# Patient Record
Sex: FEMALE | Race: WHITE | Employment: UNEMPLOYED | URBAN - METROPOLITAN AREA
[De-identification: names, ages, dates, MRNs, and addresses within clinical notes are randomized per-mention and may not be internally consistent; named-entity substitution may affect disease eponyms.]

---

## 2017-08-30 ENCOUNTER — HOSPITAL ENCOUNTER (OUTPATIENT)
Age: 32
Discharge: HOME | End: 2017-08-30
Attending: FAMILY MEDICINE
Payer: MEDICAID

## 2017-08-30 VITALS
HEIGHT: 61 IN | DIASTOLIC BLOOD PRESSURE: 70 MMHG | TEMPERATURE: 99 F | WEIGHT: 215 LBS | HEART RATE: 102 BPM | SYSTOLIC BLOOD PRESSURE: 121 MMHG | BODY MASS INDEX: 40.59 KG/M2 | OXYGEN SATURATION: 98 % | RESPIRATION RATE: 20 BRPM

## 2017-08-30 DIAGNOSIS — J11.1 FLU SYNDROME: ICD-10-CM

## 2017-08-30 DIAGNOSIS — J06.9 UPPER RESPIRATORY TRACT INFECTION, UNSPECIFIED TYPE: Primary | ICD-10-CM

## 2017-08-30 DIAGNOSIS — J02.9 ACUTE PHARYNGITIS, UNSPECIFIED ETIOLOGY: ICD-10-CM

## 2017-08-30 LAB — POCT RAPID STREP: NEGATIVE

## 2017-08-30 PROCEDURE — 87430 STREP A AG IA: CPT | Performed by: FAMILY MEDICINE

## 2017-08-30 PROCEDURE — 87081 CULTURE SCREEN ONLY: CPT | Performed by: FAMILY MEDICINE

## 2017-08-30 PROCEDURE — 99204 OFFICE O/P NEW MOD 45 MIN: CPT

## 2017-08-30 PROCEDURE — 99203 OFFICE O/P NEW LOW 30 MIN: CPT

## 2017-08-30 RX ORDER — PRENATAL VIT/IRON FUM/FOLIC AC 27MG-0.8MG
1 TABLET ORAL DAILY
COMMUNITY
End: 2020-03-10

## 2017-08-30 NOTE — ED PROVIDER NOTES
Patient presents with:  Fever  Cough  Body ache and/or chills    HPI:     Coreen Aaron is a 28year old female who presents with for chief complaint of nasal congestion, coryza, clear rhinorrhea, sore throat, headache, body aches, muscle aches, fatigue, ma tenderness. No nasal flaring  Throat: abnormal findings: mild oropharyngeal erythema  Neck: no adenopathy  Lungs: clear to auscultation bilaterally. No wheezing, rhonchi or crackles No chest wall retractions. No respiratory distress. No tachypnea noted. Jona Tejada

## 2018-02-27 ENCOUNTER — HOSPITAL ENCOUNTER (OUTPATIENT)
Age: 33
Discharge: HOME | End: 2018-02-27
Attending: FAMILY MEDICINE
Payer: MEDICAID

## 2018-02-27 VITALS
BODY MASS INDEX: 37.76 KG/M2 | DIASTOLIC BLOOD PRESSURE: 76 MMHG | TEMPERATURE: 98 F | RESPIRATION RATE: 16 BRPM | SYSTOLIC BLOOD PRESSURE: 118 MMHG | HEIGHT: 61 IN | WEIGHT: 200 LBS | OXYGEN SATURATION: 96 % | HEART RATE: 86 BPM

## 2018-02-27 DIAGNOSIS — J02.0 STREPTOCOCCAL SORE THROAT: ICD-10-CM

## 2018-02-27 DIAGNOSIS — J03.90 ACUTE TONSILLITIS, UNSPECIFIED ETIOLOGY: Primary | ICD-10-CM

## 2018-02-27 LAB — POCT RAPID STREP: POSITIVE

## 2018-02-27 PROCEDURE — 87430 STREP A AG IA: CPT | Performed by: FAMILY MEDICINE

## 2018-02-27 PROCEDURE — 99213 OFFICE O/P EST LOW 20 MIN: CPT

## 2018-02-27 PROCEDURE — 99214 OFFICE O/P EST MOD 30 MIN: CPT

## 2018-02-27 RX ORDER — AMOXICILLIN 875 MG/1
875 TABLET, COATED ORAL 2 TIMES DAILY
Qty: 20 TABLET | Refills: 0 | Status: SHIPPED | OUTPATIENT
Start: 2018-02-27 | End: 2018-03-09

## 2018-02-27 NOTE — ED PROVIDER NOTES
Patient Seen in: [address] [facility]    History   Patient presents with:  Cough/URI  Sore Throat    Stated Complaint: cough/sinus congestion    HPI    26-year-old female presented chief complaint of sore throat, swollen glands and fevers nasa bilaterally  Heart S1-S2 heard no murmurs no gallops  Skin shows no rash        ED Course     Labs Reviewed   POCT RAPID STREP - Abnormal; Notable for the following:        Result Value    POCT Rapid Strep Positive (*)     All other components within bettina

## 2019-11-12 ENCOUNTER — HOSPITAL ENCOUNTER (OUTPATIENT)
Age: 34
Discharge: HOME | End: 2019-11-12
Attending: FAMILY MEDICINE
Payer: MEDICAID

## 2019-11-12 VITALS
TEMPERATURE: 99 F | BODY MASS INDEX: 40.59 KG/M2 | OXYGEN SATURATION: 97 % | SYSTOLIC BLOOD PRESSURE: 136 MMHG | RESPIRATION RATE: 16 BRPM | HEIGHT: 61 IN | HEART RATE: 98 BPM | DIASTOLIC BLOOD PRESSURE: 95 MMHG | WEIGHT: 215 LBS

## 2019-11-12 DIAGNOSIS — H10.9 CONJUNCTIVITIS OF RIGHT EYE, UNSPECIFIED CONJUNCTIVITIS TYPE: ICD-10-CM

## 2019-11-12 DIAGNOSIS — B34.9 VIRAL SYNDROME: Primary | ICD-10-CM

## 2019-11-12 DIAGNOSIS — J02.9 PHARYNGITIS, UNSPECIFIED ETIOLOGY: ICD-10-CM

## 2019-11-12 PROCEDURE — 87430 STREP A AG IA: CPT | Performed by: FAMILY MEDICINE

## 2019-11-12 PROCEDURE — 87081 CULTURE SCREEN ONLY: CPT | Performed by: FAMILY MEDICINE

## 2019-11-12 PROCEDURE — 99214 OFFICE O/P EST MOD 30 MIN: CPT

## 2019-11-12 RX ORDER — TOBRAMYCIN 3 MG/ML
2 SOLUTION/ DROPS OPHTHALMIC EVERY 8 HOURS
Qty: 10 ML | Refills: 0 | Status: SHIPPED | OUTPATIENT
Start: 2019-11-12 | End: 2019-11-17

## 2019-11-13 NOTE — ED INITIAL ASSESSMENT (HPI)
Sore throat started over the weekend. Felt like she had a fever. Right eye redness, drainage and itching started yesterday. Also having right ear pain.

## 2019-11-13 NOTE — ED PROVIDER NOTES
Patient Seen in: [address] [facility]      History   Patient presents with:  Conjunctivitis  Sore Throat    Stated Complaint: eye redness, sore throat    HPI  72-year-old female coming in with right eye redness and drainage that started yester occlusion, no obstruction, bilateral tympanic membranes-middle ear effusion noted, no signs of otitis media, nares normal  NECK: FROM, supple anterior cervical lymphadenopathy noted bilaterally  LUNGS: CTAB, no RRW  CV: RRR  ABD: not distended  NEURO: Aler

## 2020-03-10 ENCOUNTER — HOSPITAL ENCOUNTER (OUTPATIENT)
Age: 35
Discharge: HOME | End: 2020-03-10
Attending: FAMILY MEDICINE
Payer: MEDICAID

## 2020-03-10 VITALS
RESPIRATION RATE: 16 BRPM | HEART RATE: 92 BPM | OXYGEN SATURATION: 98 % | SYSTOLIC BLOOD PRESSURE: 113 MMHG | TEMPERATURE: 99 F | DIASTOLIC BLOOD PRESSURE: 59 MMHG

## 2020-03-10 DIAGNOSIS — R09.82 POST-NASAL DRIP: ICD-10-CM

## 2020-03-10 DIAGNOSIS — J01.00 ACUTE MAXILLARY SINUSITIS, RECURRENCE NOT SPECIFIED: Primary | ICD-10-CM

## 2020-03-10 PROCEDURE — 99212 OFFICE O/P EST SF 10 MIN: CPT

## 2020-03-10 RX ORDER — IBUPROFEN 600 MG/1
600 TABLET ORAL ONCE
Status: COMPLETED | OUTPATIENT
Start: 2020-03-10 | End: 2020-03-10

## 2020-03-10 NOTE — ED PROVIDER NOTES
Patient Seen in: [address] [facility]      History   Patient presents with:  Cough/URI  Sore Throat  Ear Problem Pain    Stated Complaint: Cough, Ear Pain    HPI  This is a 70-year-old female coming in with 1 to 2 days of cough, with some left to display  Orders Placed This Encounter      ibuprofen (MOTRIN) tab 600 mg    Patient verbalized understanding and agreed with the plan.          MDM       · OTC Flonase NS 2 puffs each nostril once daily to help with sinus inflammation and reduce post addy

## 2021-08-24 PROBLEM — Z98.84 S/P LAPAROSCOPIC SLEEVE GASTRECTOMY: Status: ACTIVE | Noted: 2021-08-24

## 2021-08-28 PROBLEM — K31.89 GASTRIC STENOSIS: Status: ACTIVE | Noted: 2021-08-28

## 2021-08-28 PROBLEM — R11.0 NAUSEA: Status: ACTIVE | Noted: 2021-08-28

## 2021-08-28 PROBLEM — K21.9 GASTROESOPHAGEAL REFLUX DISEASE, UNSPECIFIED WHETHER ESOPHAGITIS PRESENT: Status: ACTIVE | Noted: 2021-08-28

## 2021-08-28 PROBLEM — K44.9 HIATAL HERNIA: Status: ACTIVE | Noted: 2021-08-28

## 2021-09-03 ENCOUNTER — HOSPITAL ENCOUNTER (EMERGENCY)
Facility: HOSPITAL | Age: 36
Discharge: HOME | End: 2021-09-03
Attending: EMERGENCY MEDICINE
Payer: MEDICAID

## 2021-09-03 VITALS
HEART RATE: 69 BPM | SYSTOLIC BLOOD PRESSURE: 104 MMHG | OXYGEN SATURATION: 100 % | DIASTOLIC BLOOD PRESSURE: 72 MMHG | WEIGHT: 160 LBS | RESPIRATION RATE: 16 BRPM | HEIGHT: 61 IN | BODY MASS INDEX: 30.21 KG/M2 | TEMPERATURE: 98 F

## 2021-09-03 DIAGNOSIS — E87.6 HYPOKALEMIA: ICD-10-CM

## 2021-09-03 DIAGNOSIS — R11.2 NAUSEA AND VOMITING, INTRACTABILITY OF VOMITING NOT SPECIFIED, UNSPECIFIED VOMITING TYPE: Primary | ICD-10-CM

## 2021-09-03 DIAGNOSIS — R31.9 URINARY TRACT INFECTION WITH HEMATURIA, SITE UNSPECIFIED: ICD-10-CM

## 2021-09-03 DIAGNOSIS — N39.0 URINARY TRACT INFECTION WITH HEMATURIA, SITE UNSPECIFIED: ICD-10-CM

## 2021-09-03 LAB
ALBUMIN SERPL-MCNC: 3.5 G/DL (ref 3.4–5)
ALBUMIN/GLOB SERPL: 0.7 {RATIO} (ref 1–2)
ALP LIVER SERPL-CCNC: 83 U/L
ALT SERPL-CCNC: 73 U/L
ANION GAP SERPL CALC-SCNC: 13 MMOL/L (ref 0–18)
AST SERPL-CCNC: 41 U/L (ref 15–37)
B-HCG UR QL: NEGATIVE
BASOPHILS # BLD AUTO: 0.05 X10(3) UL (ref 0–0.2)
BASOPHILS NFR BLD AUTO: 0.8 %
BILIRUB SERPL-MCNC: 1.6 MG/DL (ref 0.1–2)
BILIRUB UR QL CFM: POSITIVE
BUN BLD-MCNC: 5 MG/DL (ref 7–18)
CALCIUM BLD-MCNC: 9 MG/DL (ref 8.5–10.1)
CHLORIDE SERPL-SCNC: 104 MMOL/L (ref 98–112)
CO2 SERPL-SCNC: 21 MMOL/L (ref 21–32)
COLOR UR AUTO: YELLOW
CREAT BLD-MCNC: 0.54 MG/DL
EOSINOPHIL # BLD AUTO: 0.06 X10(3) UL (ref 0–0.7)
EOSINOPHIL NFR BLD AUTO: 1 %
ERYTHROCYTE [DISTWIDTH] IN BLOOD BY AUTOMATED COUNT: 14.6 %
GLOBULIN PLAS-MCNC: 4.8 G/DL (ref 2.8–4.4)
GLUCOSE BLD-MCNC: 77 MG/DL (ref 70–99)
GLUCOSE UR STRIP.AUTO-MCNC: NEGATIVE MG/DL
HCT VFR BLD AUTO: 39.8 %
HGB BLD-MCNC: 14.2 G/DL
HYALINE CASTS #/AREA URNS AUTO: PRESENT /LPF
IMM GRANULOCYTES # BLD AUTO: 0.02 X10(3) UL (ref 0–1)
IMM GRANULOCYTES NFR BLD: 0.3 %
KETONES UR STRIP.AUTO-MCNC: 80 MG/DL
LEUKOCYTE ESTERASE UR QL STRIP.AUTO: NEGATIVE
LYMPHOCYTES # BLD AUTO: 1.41 X10(3) UL (ref 1–4)
LYMPHOCYTES NFR BLD AUTO: 22.8 %
M PROTEIN MFR SERPL ELPH: 8.3 G/DL (ref 6.4–8.2)
MCH RBC QN AUTO: 31.6 PG (ref 26–34)
MCHC RBC AUTO-ENTMCNC: 35.7 G/DL (ref 31–37)
MCV RBC AUTO: 88.4 FL
MONOCYTES # BLD AUTO: 0.53 X10(3) UL (ref 0.1–1)
MONOCYTES NFR BLD AUTO: 8.6 %
NEUTROPHILS # BLD AUTO: 4.11 X10 (3) UL (ref 1.5–7.7)
NEUTROPHILS # BLD AUTO: 4.11 X10(3) UL (ref 1.5–7.7)
NEUTROPHILS NFR BLD AUTO: 66.5 %
NITRITE UR QL STRIP.AUTO: NEGATIVE
OSMOLALITY SERPL CALC.SUM OF ELEC: 282 MOSM/KG (ref 275–295)
PH UR STRIP.AUTO: 6 [PH] (ref 5–8)
PLATELET # BLD AUTO: 305 10(3)UL (ref 150–450)
POTASSIUM SERPL-SCNC: 2.8 MMOL/L (ref 3.5–5.1)
PROT UR STRIP.AUTO-MCNC: 100 MG/DL
RBC # BLD AUTO: 4.5 X10(6)UL
RBC UR QL AUTO: NEGATIVE
SODIUM SERPL-SCNC: 138 MMOL/L (ref 136–145)
SP GR UR STRIP.AUTO: 1.02 (ref 1–1.03)
UROBILINOGEN UR STRIP.AUTO-MCNC: 4 MG/DL
WBC # BLD AUTO: 6.2 X10(3) UL (ref 4–11)

## 2021-09-03 PROCEDURE — 87086 URINE CULTURE/COLONY COUNT: CPT | Performed by: EMERGENCY MEDICINE

## 2021-09-03 PROCEDURE — 96365 THER/PROPH/DIAG IV INF INIT: CPT

## 2021-09-03 PROCEDURE — 85025 COMPLETE CBC W/AUTO DIFF WBC: CPT | Performed by: EMERGENCY MEDICINE

## 2021-09-03 PROCEDURE — 99284 EMERGENCY DEPT VISIT MOD MDM: CPT

## 2021-09-03 PROCEDURE — 96375 TX/PRO/DX INJ NEW DRUG ADDON: CPT

## 2021-09-03 PROCEDURE — 99285 EMERGENCY DEPT VISIT HI MDM: CPT

## 2021-09-03 PROCEDURE — 80053 COMPREHEN METABOLIC PANEL: CPT | Performed by: EMERGENCY MEDICINE

## 2021-09-03 PROCEDURE — 85025 COMPLETE CBC W/AUTO DIFF WBC: CPT

## 2021-09-03 PROCEDURE — 96367 TX/PROPH/DG ADDL SEQ IV INF: CPT

## 2021-09-03 PROCEDURE — 96366 THER/PROPH/DIAG IV INF ADDON: CPT

## 2021-09-03 PROCEDURE — 81001 URINALYSIS AUTO W/SCOPE: CPT | Performed by: EMERGENCY MEDICINE

## 2021-09-03 PROCEDURE — 80053 COMPREHEN METABOLIC PANEL: CPT

## 2021-09-03 PROCEDURE — 81025 URINE PREGNANCY TEST: CPT

## 2021-09-03 PROCEDURE — 96361 HYDRATE IV INFUSION ADD-ON: CPT

## 2021-09-03 RX ORDER — METOCLOPRAMIDE HYDROCHLORIDE 5 MG/ML
10 INJECTION INTRAMUSCULAR; INTRAVENOUS ONCE
Status: COMPLETED | OUTPATIENT
Start: 2021-09-03 | End: 2021-09-03

## 2021-09-03 RX ORDER — CEPHALEXIN 250 MG/5ML
500 POWDER, FOR SUSPENSION ORAL 4 TIMES DAILY
Qty: 400 ML | Refills: 0 | Status: SHIPPED | OUTPATIENT
Start: 2021-09-03 | End: 2021-09-08

## 2021-09-03 RX ORDER — POTASSIUM CHLORIDE 1.5 G/1.77G
20 POWDER, FOR SOLUTION ORAL 2 TIMES DAILY
Status: DISCONTINUED | OUTPATIENT
Start: 2021-09-03 | End: 2021-09-03

## 2021-09-03 RX ORDER — POTASSIUM CHLORIDE 1.5 G/1.77G
20 POWDER, FOR SOLUTION ORAL 2 TIMES DAILY
Qty: 10 PACKET | Refills: 0 | Status: SHIPPED | OUTPATIENT
Start: 2021-09-03 | End: 2021-09-08

## 2021-09-03 RX ORDER — METOCLOPRAMIDE 10 MG/1
10 TABLET ORAL 3 TIMES DAILY PRN
Qty: 20 TABLET | Refills: 0 | Status: SHIPPED | OUTPATIENT
Start: 2021-09-03 | End: 2021-10-03

## 2021-09-03 NOTE — ED PROVIDER NOTES
Patient Seen in: [facility] Emergency Department      History   Patient presents with:  Nausea/Vomiting/Diarrhea    Stated Complaint: n/v, dehydration x few weeks.      HPI/Subjective:   HPI    29-year-old female presents to the ER with 3 weeks of vo apparent distress alert and oriented ×3  HEENT: Pupils are equal reactive to light. Extra ocular motions are intact. No scleral icterus or conjunctival pallor. Head is atraumatic normocephalic. Oral mucosa moist.  Tongue is midline.     Lungs: Clear to CBC W/ DIFFERENTIAL[[id number]]                              Final result                 Please view results for these tests on the individual orders.    RAINBOW DRAW LAVENDER   RAINBOW DRAW LIGHT GREEN   RAINBOW DRAW GOLD   URINE Janene Delgado medications    potassium chloride 20 MEQ Oral Powd Pack  Take 20 mEq by mouth 2 (two) times daily for 5 days. Qty: 10 packet Refills: 0    cephALEXin 250 MG/5ML Oral Recon Susp  Take 10 mL (500 mg total) by mouth 4 (four) times daily for 10 days.   Qty: 40

## 2021-09-04 NOTE — ED PROVIDER NOTES
Patient states that she is feeling better. She was able to tolerate fluids. She states that when she was taking the potassium liquid she could only do small amounts as it did make her upset this is not unusual with potassium.   She is advised to take it a

## 2021-09-08 ENCOUNTER — HOSPITAL ENCOUNTER (INPATIENT)
Facility: HOSPITAL | Age: 36
LOS: 4 days | Discharge: ANOTHER HOSPITAL | DRG: 395 | End: 2021-09-13
Attending: EMERGENCY MEDICINE | Admitting: HOSPITALIST
Payer: MEDICAID

## 2021-09-08 DIAGNOSIS — R11.10 INTRACTABLE VOMITING, PRESENCE OF NAUSEA NOT SPECIFIED, UNSPECIFIED VOMITING TYPE: Primary | ICD-10-CM

## 2021-09-08 DIAGNOSIS — E87.6 HYPOKALEMIA: ICD-10-CM

## 2021-09-08 LAB
ALBUMIN SERPL-MCNC: 3.4 G/DL (ref 3.4–5)
ALBUMIN/GLOB SERPL: 0.7 {RATIO} (ref 1–2)
ALP LIVER SERPL-CCNC: 86 U/L
ALT SERPL-CCNC: 100 U/L
ANION GAP SERPL CALC-SCNC: 16 MMOL/L (ref 0–18)
AST SERPL-CCNC: 54 U/L (ref 15–37)
BASOPHILS # BLD AUTO: 0.06 X10(3) UL (ref 0–0.2)
BASOPHILS NFR BLD AUTO: 0.9 %
BILIRUB SERPL-MCNC: 1.2 MG/DL (ref 0.1–2)
BUN BLD-MCNC: 6 MG/DL (ref 7–18)
CALCIUM BLD-MCNC: 9.3 MG/DL (ref 8.5–10.1)
CHLORIDE SERPL-SCNC: 100 MMOL/L (ref 98–112)
CO2 SERPL-SCNC: 22 MMOL/L (ref 21–32)
CREAT BLD-MCNC: 0.47 MG/DL
EOSINOPHIL # BLD AUTO: 0.02 X10(3) UL (ref 0–0.7)
EOSINOPHIL NFR BLD AUTO: 0.3 %
ERYTHROCYTE [DISTWIDTH] IN BLOOD BY AUTOMATED COUNT: 14.1 %
GLOBULIN PLAS-MCNC: 4.9 G/DL (ref 2.8–4.4)
GLUCOSE BLD-MCNC: 84 MG/DL (ref 70–99)
HCT VFR BLD AUTO: 39.1 %
HGB BLD-MCNC: 14.1 G/DL
IMM GRANULOCYTES # BLD AUTO: 0.04 X10(3) UL (ref 0–1)
IMM GRANULOCYTES NFR BLD: 0.6 %
LYMPHOCYTES # BLD AUTO: 1.54 X10(3) UL (ref 1–4)
LYMPHOCYTES NFR BLD AUTO: 23.3 %
M PROTEIN MFR SERPL ELPH: 8.3 G/DL (ref 6.4–8.2)
MCH RBC QN AUTO: 31.8 PG (ref 26–34)
MCHC RBC AUTO-ENTMCNC: 36.1 G/DL (ref 31–37)
MCV RBC AUTO: 88.1 FL
MONOCYTES # BLD AUTO: 0.71 X10(3) UL (ref 0.1–1)
MONOCYTES NFR BLD AUTO: 10.7 %
NEUTROPHILS # BLD AUTO: 4.24 X10 (3) UL (ref 1.5–7.7)
NEUTROPHILS # BLD AUTO: 4.24 X10(3) UL (ref 1.5–7.7)
NEUTROPHILS NFR BLD AUTO: 64.2 %
OSMOLALITY SERPL CALC.SUM OF ELEC: 283 MOSM/KG (ref 275–295)
PLATELET # BLD AUTO: 338 10(3)UL (ref 150–450)
POTASSIUM SERPL-SCNC: 2.5 MMOL/L (ref 3.5–5.1)
RBC # BLD AUTO: 4.44 X10(6)UL
SARS-COV-2 RNA RESP QL NAA+PROBE: NOT DETECTED
SODIUM SERPL-SCNC: 138 MMOL/L (ref 136–145)
WBC # BLD AUTO: 6.6 X10(3) UL (ref 4–11)

## 2021-09-08 PROCEDURE — 99285 EMERGENCY DEPT VISIT HI MDM: CPT | Performed by: EMERGENCY MEDICINE

## 2021-09-08 PROCEDURE — 96366 THER/PROPH/DIAG IV INF ADDON: CPT | Performed by: EMERGENCY MEDICINE

## 2021-09-08 PROCEDURE — 85025 COMPLETE CBC W/AUTO DIFF WBC: CPT | Performed by: EMERGENCY MEDICINE

## 2021-09-08 PROCEDURE — 80053 COMPREHEN METABOLIC PANEL: CPT | Performed by: EMERGENCY MEDICINE

## 2021-09-08 PROCEDURE — 96361 HYDRATE IV INFUSION ADD-ON: CPT | Performed by: EMERGENCY MEDICINE

## 2021-09-08 PROCEDURE — 96365 THER/PROPH/DIAG IV INF INIT: CPT | Performed by: EMERGENCY MEDICINE

## 2021-09-08 RX ORDER — ONDANSETRON 2 MG/ML
4 INJECTION INTRAMUSCULAR; INTRAVENOUS EVERY 6 HOURS PRN
Status: DISCONTINUED | OUTPATIENT
Start: 2021-09-08 | End: 2021-09-13

## 2021-09-08 RX ORDER — CEPHALEXIN 250 MG/5ML
500 POWDER, FOR SUSPENSION ORAL 4 TIMES DAILY
COMMUNITY
Start: 2021-09-04 | End: 2021-09-13

## 2021-09-08 RX ORDER — SODIUM CHLORIDE 9 MG/ML
INJECTION, SOLUTION INTRAVENOUS CONTINUOUS
Status: DISCONTINUED | OUTPATIENT
Start: 2021-09-09 | End: 2021-09-13

## 2021-09-08 RX ORDER — ONDANSETRON 2 MG/ML
4 INJECTION INTRAMUSCULAR; INTRAVENOUS EVERY 4 HOURS PRN
Status: ACTIVE | OUTPATIENT
Start: 2021-09-08 | End: 2021-09-09

## 2021-09-08 RX ORDER — SODIUM CHLORIDE 9 MG/ML
INJECTION, SOLUTION INTRAVENOUS CONTINUOUS
Status: ACTIVE | OUTPATIENT
Start: 2021-09-09 | End: 2021-09-09

## 2021-09-08 RX ORDER — MORPHINE SULFATE 2 MG/ML
2 INJECTION, SOLUTION INTRAMUSCULAR; INTRAVENOUS EVERY 2 HOUR PRN
Status: DISCONTINUED | OUTPATIENT
Start: 2021-09-08 | End: 2021-09-13

## 2021-09-08 RX ORDER — MORPHINE SULFATE 2 MG/ML
1 INJECTION, SOLUTION INTRAMUSCULAR; INTRAVENOUS EVERY 2 HOUR PRN
Status: DISCONTINUED | OUTPATIENT
Start: 2021-09-08 | End: 2021-09-13

## 2021-09-08 RX ORDER — POTASSIUM CHLORIDE 1.5 G/1.77G
20 POWDER, FOR SOLUTION ORAL 2 TIMES DAILY
COMMUNITY
Start: 2021-09-04 | End: 2021-09-13

## 2021-09-08 RX ORDER — PROCHLORPERAZINE EDISYLATE 5 MG/ML
5 INJECTION INTRAMUSCULAR; INTRAVENOUS EVERY 8 HOURS PRN
Status: DISCONTINUED | OUTPATIENT
Start: 2021-09-08 | End: 2021-09-13

## 2021-09-08 NOTE — ED INITIAL ASSESSMENT (HPI)
Pt presents to ED with complaint of nausea and vomiting for 3-4 weeks.  Pt reports worse today and feeling weak and dizzy,

## 2021-09-09 LAB
ANION GAP SERPL CALC-SCNC: 12 MMOL/L (ref 0–18)
BUN BLD-MCNC: 7 MG/DL (ref 7–18)
CALCIUM BLD-MCNC: 8.4 MG/DL (ref 8.5–10.1)
CHLORIDE SERPL-SCNC: 105 MMOL/L (ref 98–112)
CO2 SERPL-SCNC: 23 MMOL/L (ref 21–32)
CREAT BLD-MCNC: 0.34 MG/DL
GLUCOSE BLD-MCNC: 77 MG/DL (ref 70–99)
MAGNESIUM SERPL-MCNC: 1.9 MG/DL (ref 1.6–2.6)
OSMOLALITY SERPL CALC.SUM OF ELEC: 287 MOSM/KG (ref 275–295)
POTASSIUM SERPL-SCNC: 2.2 MMOL/L (ref 3.5–5.1)
POTASSIUM SERPL-SCNC: 2.8 MMOL/L (ref 3.5–5.1)
SODIUM SERPL-SCNC: 140 MMOL/L (ref 136–145)

## 2021-09-09 PROCEDURE — 80048 BASIC METABOLIC PNL TOTAL CA: CPT | Performed by: INTERNAL MEDICINE

## 2021-09-09 PROCEDURE — 84132 ASSAY OF SERUM POTASSIUM: CPT | Performed by: INTERNAL MEDICINE

## 2021-09-09 PROCEDURE — 83735 ASSAY OF MAGNESIUM: CPT | Performed by: INTERNAL MEDICINE

## 2021-09-09 PROCEDURE — C9113 INJ PANTOPRAZOLE SODIUM, VIA: HCPCS | Performed by: INTERNAL MEDICINE

## 2021-09-09 RX ORDER — POTASSIUM CHLORIDE 14.9 MG/ML
20 INJECTION INTRAVENOUS ONCE
Status: COMPLETED | OUTPATIENT
Start: 2021-09-10 | End: 2021-09-10

## 2021-09-09 RX ORDER — POTASSIUM CHLORIDE 14.9 MG/ML
20 INJECTION INTRAVENOUS ONCE
Status: COMPLETED | OUTPATIENT
Start: 2021-09-09 | End: 2021-09-09

## 2021-09-09 NOTE — ED PROVIDER NOTES
Patient Seen in: [facility] Emergency Department      History   Patient presents with:  Nausea/Vomiting/Diarrhea    Stated Complaint: N/V    HPI/Subjective:   HPI    27-year-old female presents reporting intractable vomiting.   She says that about 2 Triage Vitals [09/08/21 1802]   /85   Pulse 92   Resp 18   Temp 97.4 °F (36.3 °C)   Temp src Temporal   SpO2 100 %   O2 Device None (Room air)       Current:/85   Pulse 92   Temp 97.4 °F (36.3 °C) (Temporal)   Resp 18   Ht 154.9 cm (5' 1\")   W radiograph of the abdomen on 9/2/2021. IMAGING PROTOCOL TECHNIQUE: Intermittent 4 pulse fluoroscopy was used, maximizing the utilization of save-screen images in order to minimize the overall radiation dose in accordance with ALARA.  The exam was performed and into the duodenum without obstruction or significant delay. DUODENUM: The duodenal bulb is well visualized without gross abnormality. The second, third, and fourth portions of the duodenum are normal in course and caliber and grossly unremarkable.

## 2021-09-09 NOTE — CONSULTS
Naomy Cutler MD    Department of Gastroenterology  [facility] 9W Patient Status:  Observation    1985 MRN [id number]   [facility] 3SW-A Attending Nicolle Phan,    Hosp Day # 0 PC patient to be added onto office clinic schedule for this Friday. Please contact patient to schedule visit. Thank you.                DATE OF SERVICE: [phone]   UPPER GI     CLINICAL INFORMATION:  39year-old female complains of persistent nausea, vomitin upright. Patient was unable to swallow the 13 mm barium tablet. STOMACH:   Patient status post gastric sleeve. No evidence of stricture, filling defect, fistula, contrast extravasation or obstruction.    There is a persistent area of narrowing in potassium chloride IVPB premix 20 mEq, 20 mEq, Intravenous, Once  •  ondansetron (ZOFRAN) injection 4 mg, 4 mg, Intravenous, Q6H PRN  •  Prochlorperazine Edisylate (COMPAZINE) injection 5 mg, 5 mg, Intravenous, Q8H PRN  •  0.9% NaCl infusion, , Intravenous gallops. Lungs: Clear to auscultation. Abdomen: Soft and nondistended. Nontender. No masses. Bowel sounds are present. Back: No CVA tenderness. Extremities: No edema, cyanosis, or clubbing. Skin: Warm and dry.   Rectal: deferred  Laboratory Data:  L

## 2021-09-09 NOTE — H&P
[facility] Hospitalist Team  History and Physical       Assessment/Plan:     #Intractable N/v  -gi on consult, planning for EGD when electrolytes improved  -anti-nausea meds ordered  -IVF    #Hypokalemia, severe  -replete  -monitor on tele    PPX: scds    Candler Sheyenne (-)urgency  MUSCULOSKELETAL:  (-) arthritis (-) muscle cramps  SKIN:  (-) rashes (-) hair loss  NEUROLOGIC:  (-) paresthesias (-) weakness (-) numbness  PSYCHIATRIC:  (-) disorder of thought or mood  ENDOCRINE:  (-) heat or cold intolerance (-) polyuria (- of persistent nausea, vomiting, and inability to keep food down since gastric sleeve surgery in July 2021. COMPARISON STUDY: Plain radiograph of the abdomen on 9/2/2021.  IMAGING PROTOCOL TECHNIQUE: Intermittent 4 pulse fluoroscopy was used, maximizing the distend during the exam. Contrast did pass through this area through a narrow channel. The contrast readily flows through the pylorus and into the duodenum without obstruction or significant delay.  DUODENUM: The duodenal bulb is well visualized without emily

## 2021-09-09 NOTE — H&P
[facility] Hospitalist H&P       CC: Patient presents with:  Nausea/Vomiting/Diarrhea       PCP: Ana Kumar MD    History of Present Illness: 38 y/o w hx of gastrectomy 2 months ago and post op UGI no leak, had egd at [facility] w 3cm hiatal hernia and some narro without obvious abnormality, atraumatic. Eyes:  Sclera anicteric, No conjunctival pallor, EOMs intact. Nose: Nares normal. Septum midline. Mucosa normal. No drainage.    Throat: Lips, mucosa, and tongue normal. Teeth and gums normal.   Neck: Supple, sy sliding-type hiatal hernia.         ASSESSMENT / PLAN:   38 y/o w hx of gastrectomy 2 months ago and post op UGI no leak, had egd at [facility] w 3cm hiatal hernia and some narrowing but no dilation, cholecystecomy here w nausea/vomitting     Emesis:   -barium s

## 2021-09-09 NOTE — PLAN OF CARE
Pt A&Ox4. On room air. VSS. K+ replaced per protocol. Pt advanced to clear liquid diet today. IV compazine given for nausea and effective. Pt voiding without difficulty. IVF infusing per order. Pain controlled at this time.  Fall precautions in place and pt

## 2021-09-09 NOTE — PLAN OF CARE
Patient admitted via cart from ED, pt in pain and tearful upon arrival. Oriented to room. A&O x4. VSS on RA. Reports mild-moderate abdominal pain/cramping. Bowel sounds present. Abdomen soft, tender. Antiemetics given for nausea with good relief.  Pt kept N

## 2021-09-10 ENCOUNTER — ANESTHESIA (OUTPATIENT)
Dept: ENDOSCOPY | Facility: HOSPITAL | Age: 36
DRG: 395 | End: 2021-09-10
Payer: MEDICAID

## 2021-09-10 ENCOUNTER — ANESTHESIA EVENT (OUTPATIENT)
Dept: ENDOSCOPY | Facility: HOSPITAL | Age: 36
DRG: 395 | End: 2021-09-10
Payer: MEDICAID

## 2021-09-10 LAB
ALBUMIN SERPL-MCNC: 2.4 G/DL (ref 3.4–5)
ALBUMIN SERPL-MCNC: 2.9 G/DL (ref 3.4–5)
ALBUMIN/GLOB SERPL: 0.6 {RATIO} (ref 1–2)
ALBUMIN/GLOB SERPL: 0.7 {RATIO} (ref 1–2)
ALP LIVER SERPL-CCNC: 67 U/L
ALP LIVER SERPL-CCNC: 78 U/L
ALT SERPL-CCNC: 85 U/L
ALT SERPL-CCNC: 95 U/L
ANION GAP SERPL CALC-SCNC: 11 MMOL/L (ref 0–18)
ANION GAP SERPL CALC-SCNC: 13 MMOL/L (ref 0–18)
AST SERPL-CCNC: 41 U/L (ref 15–37)
AST SERPL-CCNC: 49 U/L (ref 15–37)
BILIRUB SERPL-MCNC: 1.1 MG/DL (ref 0.1–2)
BILIRUB SERPL-MCNC: 1.3 MG/DL (ref 0.1–2)
BUN BLD-MCNC: 4 MG/DL (ref 7–18)
BUN BLD-MCNC: 5 MG/DL (ref 7–18)
CALCIUM BLD-MCNC: 7.8 MG/DL (ref 8.5–10.1)
CALCIUM BLD-MCNC: 8.3 MG/DL (ref 8.5–10.1)
CHLORIDE SERPL-SCNC: 107 MMOL/L (ref 98–112)
CHLORIDE SERPL-SCNC: 107 MMOL/L (ref 98–112)
CO2 SERPL-SCNC: 19 MMOL/L (ref 21–32)
CO2 SERPL-SCNC: 21 MMOL/L (ref 21–32)
CREAT BLD-MCNC: 0.27 MG/DL
CREAT BLD-MCNC: 0.3 MG/DL
GLOBULIN PLAS-MCNC: 3.7 G/DL (ref 2.8–4.4)
GLOBULIN PLAS-MCNC: 4.1 G/DL (ref 2.8–4.4)
GLUCOSE BLD-MCNC: 72 MG/DL (ref 70–99)
GLUCOSE BLD-MCNC: 77 MG/DL (ref 70–99)
M PROTEIN MFR SERPL ELPH: 6.1 G/DL (ref 6.4–8.2)
M PROTEIN MFR SERPL ELPH: 7 G/DL (ref 6.4–8.2)
OSMOLALITY SERPL CALC.SUM OF ELEC: 283 MOSM/KG (ref 275–295)
OSMOLALITY SERPL CALC.SUM OF ELEC: 284 MOSM/KG (ref 275–295)
POTASSIUM SERPL-SCNC: 2.7 MMOL/L (ref 3.5–5.1)
POTASSIUM SERPL-SCNC: 2.8 MMOL/L (ref 3.5–5.1)
POTASSIUM SERPL-SCNC: 3.2 MMOL/L (ref 3.5–5.1)
SODIUM SERPL-SCNC: 139 MMOL/L (ref 136–145)
SODIUM SERPL-SCNC: 139 MMOL/L (ref 136–145)

## 2021-09-10 PROCEDURE — 36410 VNPNXR 3YR/> PHY/QHP DX/THER: CPT

## 2021-09-10 PROCEDURE — 0D768ZZ DILATION OF STOMACH, VIA NATURAL OR ARTIFICIAL OPENING ENDOSCOPIC: ICD-10-PCS | Performed by: INTERNAL MEDICINE

## 2021-09-10 PROCEDURE — 84132 ASSAY OF SERUM POTASSIUM: CPT | Performed by: INTERNAL MEDICINE

## 2021-09-10 PROCEDURE — 0DB68ZX EXCISION OF STOMACH, VIA NATURAL OR ARTIFICIAL OPENING ENDOSCOPIC, DIAGNOSTIC: ICD-10-PCS | Performed by: INTERNAL MEDICINE

## 2021-09-10 PROCEDURE — 88305 TISSUE EXAM BY PATHOLOGIST: CPT | Performed by: INTERNAL MEDICINE

## 2021-09-10 PROCEDURE — C9113 INJ PANTOPRAZOLE SODIUM, VIA: HCPCS | Performed by: INTERNAL MEDICINE

## 2021-09-10 PROCEDURE — 80053 COMPREHEN METABOLIC PANEL: CPT | Performed by: HOSPITALIST

## 2021-09-10 PROCEDURE — 76937 US GUIDE VASCULAR ACCESS: CPT

## 2021-09-10 RX ORDER — ENOXAPARIN SODIUM 100 MG/ML
40 INJECTION SUBCUTANEOUS DAILY
Status: DISCONTINUED | OUTPATIENT
Start: 2021-09-10 | End: 2021-09-13

## 2021-09-10 RX ORDER — SODIUM CHLORIDE, SODIUM LACTATE, POTASSIUM CHLORIDE, CALCIUM CHLORIDE 600; 310; 30; 20 MG/100ML; MG/100ML; MG/100ML; MG/100ML
INJECTION, SOLUTION INTRAVENOUS CONTINUOUS PRN
Status: DISCONTINUED | OUTPATIENT
Start: 2021-09-10 | End: 2021-09-10 | Stop reason: SURG

## 2021-09-10 RX ORDER — LIDOCAINE HYDROCHLORIDE 10 MG/ML
INJECTION, SOLUTION EPIDURAL; INFILTRATION; INTRACAUDAL; PERINEURAL AS NEEDED
Status: DISCONTINUED | OUTPATIENT
Start: 2021-09-10 | End: 2021-09-10 | Stop reason: SURG

## 2021-09-10 RX ORDER — POTASSIUM CHLORIDE 14.9 MG/ML
20 INJECTION INTRAVENOUS ONCE
Status: COMPLETED | OUTPATIENT
Start: 2021-09-10 | End: 2021-09-10

## 2021-09-10 RX ADMIN — SODIUM CHLORIDE, SODIUM LACTATE, POTASSIUM CHLORIDE, CALCIUM CHLORIDE: 600; 310; 30; 20 INJECTION, SOLUTION INTRAVENOUS at 16:18:00

## 2021-09-10 RX ADMIN — LIDOCAINE HYDROCHLORIDE 25 MG: 10 INJECTION, SOLUTION EPIDURAL; INFILTRATION; INTRACAUDAL; PERINEURAL at 16:17:00

## 2021-09-10 NOTE — PAYOR COMM NOTE
--------------  ADMISSION REVIEW     Payor: Randy Laurent #:  [id number]  Authorization Number: [id number]    ADMIT TO INPT 9/9/21    ADMIT TO OBSERVATION 9/8/21 9/8 Patient Seen in: [facility] Emergency Dep kg   LMP 08/05/2021   SpO2 100%   BMI 29.29 kg/m²     Physical Exam  General:  Vitals as listed. Tearful  HEENT: Sclerae anicteric. Conjunctivae show no pallor.   Oropharynx clear, mucous membranes moist   Neck: supple, no rigidity   Lungs: good air excha Neetu Azul is a a(n) 39year old female without significant PMH, underwent gastric sleeve in [facility] 6 weeks ago. Consult requested for intractable nausea and vomiting and 60 # weight loss over the last 6 weeks. C/o early satiety with nausea and vomiting.   Abl MG/5ML Oral Recon Susp, Take 500 mg by mouth 4 (four) times daily. For 10 days, Disp: , Rfl:   metoclopramide 10 MG Oral Tab, Take 1 tablet (10 mg total) by mouth 3 (three) times daily as needed. , Disp: 20 tablet, Rfl: 0  pantoprazole 40 MG Oral Tab EC, Ta CREATSERUM 0.54*  --  0.47* 0.34*   GLU 77  --  84 77   CA 9.0  --  9.3 8.4*   MG  --   --   --  1.9     Recent Labs   Lab 09/03/21  1313 09/03/21  1405 09/08/21  2100   ALT 73*  --  100*   AST  --  41* 54*   ALB 3.5  --  3.4         ASSESSMENT / PLAN: potassium chloride IVPB premix 20 mEq     Date Action Dose Route User    9/10/2021 0419 New Bag 20 mEq Intravenous Lukas Rios RN      potassium chloride 40 mEq in sodium chloride 0.9% 250 mL IVPB     Date Action Dose Route User    9/9/2021 213

## 2021-09-10 NOTE — ANESTHESIA PREPROCEDURE EVALUATION
PRE-OP EVALUATION    Patient Name: Arnie Ask    Admit Diagnosis: Hypokalemia [E87.6]  Intractable vomiting, presence of nausea not specified, unspecified vomiting type [R11.10]    Pre-op Diagnosis: NAUSEA/VOMITING    ESOPHAGOGASTRODUODENOSCOPY (EGD) chloride 20 MEQ Oral Powd Pack, Take 20 mEq by mouth 2 (two) times daily. For  5 days, Disp: , Rfl: , 9/7/2021 at 2000  cephALEXin 250 MG/5ML Oral Recon Susp, Take 500 mg by mouth 4 (four) times daily.  For 10 days, Disp: , Rfl: , 9/7/2021 at [address] [place] [address] Airway      Mallampati: II  Mouth opening: 3 FB  TM distance: 4 - 6 cm  Neck ROM: full Cardiovascular    Cardiovascular exam normal.  Rhythm: regular  Rate: normal     Dental    No notable dental history.          Pulmonary    Pulmonary exam normal.

## 2021-09-10 NOTE — ANESTHESIA POSTPROCEDURE EVALUATION
Jaden Hobson Patient Status:  Inpatient   Age/Gender 39year old female MRN [id number]   Location [id number] [place] [address] Attending Antonio Worthy MD   [facility] Day # 1 PCP Amna Solis MD       Anesthesia Post-op Note    ESO

## 2021-09-10 NOTE — PLAN OF CARE
A&O x4. VSS on RA. NSR on tele. Reports mild abdominal pain/cramping. Bowel sounds present. Abdomen soft, tender. Antiemetics given for nausea with good relief. Potassium redraw @ 2000 2.8, electrolyte protocol initiated.  KCl infusing as ordered, pt tolera

## 2021-09-10 NOTE — PROGRESS NOTES
[facility]    Progress Note     Steven Hardwick Patient Status:  Inpatient    1985 MRN [id number]   [facility] 3SW-A Attending Cesar Glasgow MD   Hosp Day # 1 PCP Jonn Bell MD     Chief Complaint: Patient presents with:  Ovi Stoddard values in this interval not displayed. No results for input(s): PTP, INR in the last 168 hours. No results for input(s): TROP, CK in the last 168 hours. Imaging: Imaging data reviewed in Epic.     Medications:   • pantoprazole (PROTONIX) IV

## 2021-09-10 NOTE — OPERATIVE REPORT
PATIENT NAME: Evi Recinos  MRN: [id number]  DATE OF OPERATION:  9/10/21  REFERRING PHYSICIAN: Dr. Fly Bellamy  Medications:  MAC  PREOPERATIVE DIAGNOSIS   Nausea and vomiting   Abnormal UGI xray. POSTOPERATIVE DIAGNOSIS:  1.  Normal esophagus other than a 2 - from the patient. The procedure was completed. The patient tolerated the procedure well. RECOMMENDATIONS   1. Trial of clear liquid diet. 2. Daily PPI. 3. Pending response to the dilation, would consult bariatric surgery.   Glenn Ann MD, Martha Hernandez

## 2021-09-10 NOTE — PROGRESS NOTES
Pt stating she is having IV site pain at [facility] PIV site that was removed this AM. Dr Tan Mejia paged. Received orders to administer hyaluronidaise per pharmacy protocol. See orders.  Spoke with pharmacy and warm packs recommended and applied to R arm.     1900

## 2021-09-10 NOTE — PROGRESS NOTES
Received call from lab for critical K+ 2.7. Dr Merary Juarez paged to make aware. Spoke with Dr Merary Juarez and received orders to replace per protocol.

## 2021-09-11 LAB
ALBUMIN SERPL-MCNC: 2.4 G/DL (ref 3.4–5)
ALBUMIN/GLOB SERPL: 0.7 {RATIO} (ref 1–2)
ALP LIVER SERPL-CCNC: 69 U/L
ALT SERPL-CCNC: 81 U/L
ANION GAP SERPL CALC-SCNC: 10 MMOL/L (ref 0–18)
AST SERPL-CCNC: 35 U/L (ref 15–37)
BILIRUB SERPL-MCNC: 1.2 MG/DL (ref 0.1–2)
BUN BLD-MCNC: <1 MG/DL (ref 7–18)
CALCIUM BLD-MCNC: 7.7 MG/DL (ref 8.5–10.1)
CHLORIDE SERPL-SCNC: 106 MMOL/L (ref 98–112)
CO2 SERPL-SCNC: 21 MMOL/L (ref 21–32)
CREAT BLD-MCNC: 0.24 MG/DL
GLOBULIN PLAS-MCNC: 3.5 G/DL (ref 2.8–4.4)
GLUCOSE BLD-MCNC: 76 MG/DL (ref 70–99)
M PROTEIN MFR SERPL ELPH: 5.9 G/DL (ref 6.4–8.2)
POTASSIUM SERPL-SCNC: 2.7 MMOL/L (ref 3.5–5.1)
SODIUM SERPL-SCNC: 137 MMOL/L (ref 136–145)

## 2021-09-11 PROCEDURE — 80053 COMPREHEN METABOLIC PANEL: CPT | Performed by: HOSPITALIST

## 2021-09-11 PROCEDURE — C9113 INJ PANTOPRAZOLE SODIUM, VIA: HCPCS | Performed by: INTERNAL MEDICINE

## 2021-09-11 RX ORDER — POTASSIUM CHLORIDE 14.9 MG/ML
20 INJECTION INTRAVENOUS ONCE
Status: COMPLETED | OUTPATIENT
Start: 2021-09-11 | End: 2021-09-11

## 2021-09-11 RX ORDER — MECLIZINE HYDROCHLORIDE 25 MG/1
25 TABLET ORAL 3 TIMES DAILY PRN
Status: DISCONTINUED | OUTPATIENT
Start: 2021-09-11 | End: 2021-09-13

## 2021-09-11 RX ORDER — POTASSIUM CHLORIDE 20 MEQ/1
40 TABLET, EXTENDED RELEASE ORAL EVERY 4 HOURS
Status: ACTIVE | OUTPATIENT
Start: 2021-09-11 | End: 2021-09-11

## 2021-09-11 RX ORDER — METOCLOPRAMIDE HYDROCHLORIDE 5 MG/ML
10 INJECTION INTRAMUSCULAR; INTRAVENOUS EVERY 6 HOURS PRN
Status: DISCONTINUED | OUTPATIENT
Start: 2021-09-11 | End: 2021-09-13

## 2021-09-11 RX ORDER — LORAZEPAM 2 MG/ML
0.5 INJECTION INTRAMUSCULAR
Status: ACTIVE | OUTPATIENT
Start: 2021-09-11 | End: 2021-09-11

## 2021-09-11 NOTE — PLAN OF CARE
Pt returned to unit following EGD. Pt A&Ox4. On room air. Telemetry monitoring - NSR. K+ 2.7, MD aware. Replacing per protocol. Refusing SCDs. Tolerating clear liquid diet. Voiding without difficulty.  V zofran and IV compazine given for nausea and effectiv

## 2021-09-11 NOTE — PROGRESS NOTES
Assumed care of pt at 0700. Pt remained nauseous with dry heaves all day- no significant emesis since stomach empty. Attempts to sip water and ice chips but initiates nausea. Reglan added and worked the best so far.   Pt c/o room spinning and dizziness- me

## 2021-09-11 NOTE — PLAN OF CARE
Pt given antiemetics as requested for nausea, no vomiting at this time. Pt having hiccups. Pt tolerating clear liquid diet. Voiding in bathroom. Up at safia. Ivf infusing with potassium replacement per orders/ protocol. Tele in place. Vss at this time.  Pt ve

## 2021-09-11 NOTE — PROGRESS NOTES
[facility]  Progress Note    Kennedy Benson Patient Status:  Inpatient    1985 MRN [id number]   [facility] 3SW-A Attending Kevin Meade MD   Hosp Day # 2 PCP Taz Butler MD     Subjective:  Continues to have nausea and is into BS's and no masses, HSM or tenderness  MUSCULOSKELETAL: back is not tender,FROM of the back  EXTREMITIES: no cyanosis, clubbing or edema      Labs:   Lab Results   Component Value Date    CREATSERUM 0.24 09/11/2021    BUN <1 09/11/2021     09/11/2021

## 2021-09-11 NOTE — PROGRESS NOTES
[facility]    Progress Note     Scottycandido Gonzalez Patient Status:  Inpatient    1985 MRN [id number]   [facility] 3SW-A Attending Maricarmen Garcia MD   Hosp Day # 2 PCP Lynn Steven MD     Chief Complaint: Patient presents with:  Mechelle Lam Intradermal See Admin Instructions   • pantoprazole (PROTONIX) IV push  40 mg Intravenous Q12H       ASSESSMENT / PLAN:     Bairon Sullivan is a 39year old e.sex with recent history gastrectomy July 2021 who presents with persistent nausea and vomiting maddie

## 2021-09-11 NOTE — CM/SW NOTE
LARRY was notified that Dr Raphael Smith would like to arrange patient transfer to [facility] due to no bariatric surgeon available at [facility]. CM called transfer center but they would not take patient info or a face sheet fax yet until there is an accepting surgeon's name.

## 2021-09-12 LAB
ALBUMIN SERPL-MCNC: 2.6 G/DL (ref 3.4–5)
ALBUMIN/GLOB SERPL: 0.7 {RATIO} (ref 1–2)
ALP LIVER SERPL-CCNC: 74 U/L
ALT SERPL-CCNC: 82 U/L
ANION GAP SERPL CALC-SCNC: 11 MMOL/L (ref 0–18)
AST SERPL-CCNC: 31 U/L (ref 15–37)
BILIRUB SERPL-MCNC: 1.2 MG/DL (ref 0.1–2)
BUN BLD-MCNC: 1 MG/DL (ref 7–18)
CALCIUM BLD-MCNC: 7.8 MG/DL (ref 8.5–10.1)
CHLORIDE SERPL-SCNC: 106 MMOL/L (ref 98–112)
CO2 SERPL-SCNC: 21 MMOL/L (ref 21–32)
CREAT BLD-MCNC: 0.16 MG/DL
GLOBULIN PLAS-MCNC: 3.6 G/DL (ref 2.8–4.4)
GLUCOSE BLD-MCNC: 73 MG/DL (ref 70–99)
M PROTEIN MFR SERPL ELPH: 6.2 G/DL (ref 6.4–8.2)
OSMOLALITY SERPL CALC.SUM OF ELEC: 280 MOSM/KG (ref 275–295)
POTASSIUM SERPL-SCNC: 3 MMOL/L (ref 3.5–5.1)
POTASSIUM SERPL-SCNC: 3.2 MMOL/L (ref 3.5–5.1)
SODIUM SERPL-SCNC: 138 MMOL/L (ref 136–145)

## 2021-09-12 PROCEDURE — 80053 COMPREHEN METABOLIC PANEL: CPT | Performed by: HOSPITALIST

## 2021-09-12 PROCEDURE — 84132 ASSAY OF SERUM POTASSIUM: CPT | Performed by: HOSPITALIST

## 2021-09-12 PROCEDURE — C9113 INJ PANTOPRAZOLE SODIUM, VIA: HCPCS | Performed by: INTERNAL MEDICINE

## 2021-09-12 RX ORDER — POTASSIUM CHLORIDE 14.9 MG/ML
20 INJECTION INTRAVENOUS ONCE
Status: COMPLETED | OUTPATIENT
Start: 2021-09-12 | End: 2021-09-12

## 2021-09-12 NOTE — PROGRESS NOTES
[facility]    Progress Note     Fabian Kc Patient Status:  Inpatient    1985 MRN [id number]   [facility] 3SW-A Attending Jeancarlos Barone MD   Hosp Day # 3 PCP Bianca Amaral MD     Chief Complaint: Patient presents with:  Robert Bah reviewed in Epic.     Medications:   • potassium chloride  40 mEq Intravenous Once    Followed by   • potassium chloride  20 mEq Intravenous Once   • enoxaparin  40 mg Subcutaneous Daily   • hyaluronidase bovine (AMPHADASE) 150 unit/ml ADULT EXTRAVASATION

## 2021-09-12 NOTE — PROGRESS NOTES
[facility]  Progress Note    Scarlet Garrett Patient Status:  Inpatient    1985 MRN [id number]   [facility] 3SW-A Attending Ivan Ramsey MD   Hosp Day # 3 PCP Diana Mcmullen MD     Subjective:  Continued nausea and inability to to 155 lb (70.3 kg)   LMP 08/05/2021   SpO2 99%   BMI 29.29 kg/m²   GENERAL: well developed, well nourished, in no apparent distress  HEENT: atraumatic, normocephalic  NECK: supple, no adenopathy, no bruits  CHEST: no chest tenderness  LUNGS: clear to auscult

## 2021-09-12 NOTE — PLAN OF CARE
Alert and oriented,V/S stable,tele,SR,tachy with vomiting. IVF infusing,nausea and vomiting on and off,prn medications all given as ordered,settles for a while then will have nausea and vomiting again. Taking only ice chips. Monitoring K level,will treat as n

## 2021-09-13 VITALS
WEIGHT: 155 LBS | SYSTOLIC BLOOD PRESSURE: 108 MMHG | HEIGHT: 61 IN | BODY MASS INDEX: 29.27 KG/M2 | HEART RATE: 73 BPM | DIASTOLIC BLOOD PRESSURE: 70 MMHG | RESPIRATION RATE: 17 BRPM | OXYGEN SATURATION: 99 % | TEMPERATURE: 98 F

## 2021-09-13 LAB
ALBUMIN SERPL-MCNC: 2.5 G/DL (ref 3.4–5)
ALBUMIN/GLOB SERPL: 0.7 {RATIO} (ref 1–2)
ALP LIVER SERPL-CCNC: 75 U/L
ALT SERPL-CCNC: 70 U/L
ANION GAP SERPL CALC-SCNC: 10 MMOL/L (ref 0–18)
AST SERPL-CCNC: 31 U/L (ref 15–37)
BILIRUB SERPL-MCNC: 1 MG/DL (ref 0.1–2)
BUN BLD-MCNC: 1 MG/DL (ref 7–18)
CALCIUM BLD-MCNC: 7.9 MG/DL (ref 8.5–10.1)
CHLORIDE SERPL-SCNC: 106 MMOL/L (ref 98–112)
CO2 SERPL-SCNC: 22 MMOL/L (ref 21–32)
CREAT BLD-MCNC: 0.21 MG/DL
GLOBULIN PLAS-MCNC: 3.7 G/DL (ref 2.8–4.4)
GLUCOSE BLD-MCNC: 74 MG/DL (ref 70–99)
M PROTEIN MFR SERPL ELPH: 6.2 G/DL (ref 6.4–8.2)
OSMOLALITY SERPL CALC.SUM OF ELEC: 280 MOSM/KG (ref 275–295)
POTASSIUM SERPL-SCNC: 3 MMOL/L (ref 3.5–5.1)
POTASSIUM SERPL-SCNC: 3.1 MMOL/L (ref 3.5–5.1)
SODIUM SERPL-SCNC: 138 MMOL/L (ref 136–145)

## 2021-09-13 PROCEDURE — 80053 COMPREHEN METABOLIC PANEL: CPT | Performed by: HOSPITALIST

## 2021-09-13 PROCEDURE — 84132 ASSAY OF SERUM POTASSIUM: CPT | Performed by: HOSPITALIST

## 2021-09-13 PROCEDURE — C9113 INJ PANTOPRAZOLE SODIUM, VIA: HCPCS | Performed by: INTERNAL MEDICINE

## 2021-09-13 RX ORDER — BISACODYL 10 MG
10 SUPPOSITORY, RECTAL RECTAL ONCE AS NEEDED
Status: DISCONTINUED | OUTPATIENT
Start: 2021-09-13 | End: 2021-09-13

## 2021-09-13 RX ORDER — SODIUM CHLORIDE AND POTASSIUM CHLORIDE .9; .15 G/100ML; G/100ML
SOLUTION INTRAVENOUS CONTINUOUS
Status: DISCONTINUED | OUTPATIENT
Start: 2021-09-13 | End: 2021-09-13

## 2021-09-13 NOTE — PLAN OF CARE
Alert and oriented x4. Frequently dizzy. Nauseous and vomiting. Attempted meclizine, but vomited after taking pill. HR NSR on tele, ST with HR up to 140s-150s with vomiting. Unable to tolerate anything po except sips of clears. Emesis is clear.  Potassium r

## 2021-09-13 NOTE — PLAN OF CARE
NURSING DISCHARGE NOTE    Discharged Another hospital via Ambulance. Accompanied by Support staff  Belongings Taken by patient/family.     Pt transferred via ambulance w/ IVF running to [facility]. Report called to EDBBIE James, receiving nurse at [address] [facility]-

## 2021-09-13 NOTE — CM/SW NOTE
Care Coordination [facility] Transfer Note:  Reason for transfer:   Needs bariatric surgeon, higher level of care           Anticipated Transfer Plan:   [facility] called, plan of care discussed with transfer center RN.     [facility] #: [phone]

## 2021-09-13 NOTE — CM/SW NOTE
Care Coordination [facility] Transfer Note:  Reason for transfer:   Needs bariatric surgeon, higher level of care        Anticipated Transfer Plan:   [facility] called, plan of care discussed with transfer center RN.     [facility] #: [phone]

## 2021-09-13 NOTE — PAYOR COMM NOTE
--------------  9/12- 13 CONTINUED STAY REVIEW    Payor: Randy Laurent #:  [id number]  Authorization Number: [id number]      9/12:    HOSPITALIST:  Accepted by [facility], awaiting bed availability for transfer     SHAYNA over barium swallow with narrowing in proximal gastric sleeve   - GI consulted, plan discussed  - EGD 9/10/21 with only findings of gastritis, hiatal hernia; gastric dilatation performed without improvement in symptoms   - Surgery consulted, recommending transf Intravenous Sanjiv Gan RN      Pantoprazole Sodium (PROTONIX) 40 mg in Sodium Chloride (PF) 0.9 % 10 mL IV push     Date Action Dose Route User    9/13/2021 0811 Given  Intravenous Ortega Donaldson RN    9/12/2021 2050 Given  Intravenous Ruddy Cooper

## 2021-09-13 NOTE — PLAN OF CARE
ED admit 9/8 intractable vomiting & hypokalemia, Pt is AAOX4, VSS, room air, nausea and vomiting, meds given see MAR, IVF w/ IV K+, unable to eat or drink, nausea and dizziness w/ movement, TELE, lovenox, SCD's, plan to transfer to [facility]

## 2021-09-13 NOTE — PLAN OF CARE
Alert and oriented,tele,SR,but HR up to the 140's when vomiting,then comes back to 70's-80's. Denies chest pain or discomfort. Still with on and off nausea and vomiting,prn medications given as indicated. Still has on and off dizziness,refused medication due

## 2021-09-13 NOTE — DISCHARGE SUMMARY
General Medicine Discharge Summary     Patient ID:  Janee Jo  39year old  8/30/1985    Admit date: 9/8/2021    Discharge date and time: 9/13/2021     Attending Physician: Stephy Kunz MD     [facility] sleeve   - GI consulted, plan discussed  - EGD 9/10/21 with only findings of gastritis, hiatal hernia; gastric dilatation performed without improvement in symptoms   - Surgery consulted, recommending transfer to [facility] to be evaluated by bariatric surgery (no coordinating care for discharge: Greater than 30 minutes    Bing Phan MD  [facility] Hospitalist  [phone]

## 2021-10-01 PROBLEM — R45.1 RESTLESSNESS: Status: ACTIVE | Noted: 2021-10-01

## 2021-11-22 ENCOUNTER — HOSPITAL ENCOUNTER (OUTPATIENT)
Age: 36
Discharge: HOME | End: 2021-11-22
Payer: MEDICAID

## 2021-11-22 VITALS
HEIGHT: 61 IN | BODY MASS INDEX: 27.38 KG/M2 | HEART RATE: 98 BPM | WEIGHT: 145 LBS | DIASTOLIC BLOOD PRESSURE: 76 MMHG | RESPIRATION RATE: 18 BRPM | OXYGEN SATURATION: 100 % | SYSTOLIC BLOOD PRESSURE: 114 MMHG | TEMPERATURE: 100 F

## 2021-11-22 DIAGNOSIS — J02.9 SORE THROAT: Primary | ICD-10-CM

## 2021-11-22 DIAGNOSIS — U07.1 COVID-19: ICD-10-CM

## 2021-11-22 PROCEDURE — 87880 STREP A ASSAY W/OPTIC: CPT | Performed by: NURSE PRACTITIONER

## 2021-11-22 PROCEDURE — 99203 OFFICE O/P NEW LOW 30 MIN: CPT | Performed by: NURSE PRACTITIONER

## 2021-11-22 PROCEDURE — U0002 COVID-19 LAB TEST NON-CDC: HCPCS | Performed by: NURSE PRACTITIONER

## 2021-11-22 PROCEDURE — A9150 MISC/EXPER NON-PRESCRIPT DRU: HCPCS | Performed by: NURSE PRACTITIONER

## 2021-11-22 RX ORDER — ACETAMINOPHEN 500 MG
500 TABLET ORAL ONCE
Status: COMPLETED | OUTPATIENT
Start: 2021-11-22 | End: 2021-11-22

## 2021-11-22 NOTE — ED PROVIDER NOTES
Patient Seen in: [facility]      History   Patient presents with:  Sore Throat    Stated Complaint: sore throat    Subjective:   59-year-old female presents the IC with sore throat yesterday. Patient has not been vaccinated.   Denies any nause Wt 65.8 kg   LMP 11/22/2021   SpO2 100%   BMI 27.40 kg/m²         Physical Exam    GENERAL: The patient is well-developed well-nourished nontoxic, non-ill-appearing  HEENT: Normocephalic. Atraumatic. Extraocular motions are intact  NECK: Supple.   No meni

## 2022-06-14 ENCOUNTER — HOSPITAL ENCOUNTER (OUTPATIENT)
Age: 37
Discharge: HOME | End: 2022-06-14
Payer: MEDICAID

## 2022-06-14 VITALS
DIASTOLIC BLOOD PRESSURE: 86 MMHG | HEART RATE: 102 BPM | SYSTOLIC BLOOD PRESSURE: 123 MMHG | TEMPERATURE: 99 F | OXYGEN SATURATION: 95 % | RESPIRATION RATE: 14 BRPM

## 2022-06-14 DIAGNOSIS — B34.9 VIRAL SYNDROME: Primary | ICD-10-CM

## 2022-06-14 LAB
S PYO AG THROAT QL: NEGATIVE
SARS-COV-2 RNA RESP QL NAA+PROBE: NOT DETECTED

## 2022-06-14 PROCEDURE — U0002 COVID-19 LAB TEST NON-CDC: HCPCS | Performed by: NURSE PRACTITIONER

## 2022-06-14 PROCEDURE — 99213 OFFICE O/P EST LOW 20 MIN: CPT | Performed by: NURSE PRACTITIONER

## 2022-06-14 PROCEDURE — 87880 STREP A ASSAY W/OPTIC: CPT | Performed by: NURSE PRACTITIONER

## 2022-06-14 RX ORDER — FLUOXETINE HYDROCHLORIDE 20 MG/1
20 CAPSULE ORAL DAILY
COMMUNITY

## 2022-07-16 ENCOUNTER — HOSPITAL ENCOUNTER (OUTPATIENT)
Age: 37
Discharge: HOME | End: 2022-07-16
Payer: MEDICAID

## 2022-07-16 VITALS
HEIGHT: 61 IN | OXYGEN SATURATION: 100 % | RESPIRATION RATE: 16 BRPM | DIASTOLIC BLOOD PRESSURE: 82 MMHG | TEMPERATURE: 98 F | WEIGHT: 130 LBS | SYSTOLIC BLOOD PRESSURE: 110 MMHG | BODY MASS INDEX: 24.55 KG/M2 | HEART RATE: 80 BPM

## 2022-07-16 DIAGNOSIS — L03.011 PARONYCHIA OF FINGER OF RIGHT HAND: Primary | ICD-10-CM

## 2023-03-01 ENCOUNTER — HOSPITAL ENCOUNTER (OUTPATIENT)
Age: 38
Discharge: HOME | End: 2023-03-01
Payer: MEDICAID

## 2023-03-01 VITALS
RESPIRATION RATE: 16 BRPM | OXYGEN SATURATION: 98 % | HEART RATE: 82 BPM | SYSTOLIC BLOOD PRESSURE: 105 MMHG | WEIGHT: 115 LBS | HEIGHT: 61 IN | DIASTOLIC BLOOD PRESSURE: 82 MMHG | BODY MASS INDEX: 21.71 KG/M2 | TEMPERATURE: 98 F

## 2023-03-01 DIAGNOSIS — R19.7 NAUSEA VOMITING AND DIARRHEA: ICD-10-CM

## 2023-03-01 DIAGNOSIS — B34.9 VIRAL ILLNESS: Primary | ICD-10-CM

## 2023-03-01 DIAGNOSIS — R11.2 NAUSEA VOMITING AND DIARRHEA: ICD-10-CM

## 2023-03-01 LAB
POCT INFLUENZA A: NEGATIVE
POCT INFLUENZA B: NEGATIVE
S PYO AG THROAT QL: NEGATIVE
SARS-COV-2 RNA RESP QL NAA+PROBE: NOT DETECTED

## 2023-03-01 PROCEDURE — 99213 OFFICE O/P EST LOW 20 MIN: CPT | Performed by: NURSE PRACTITIONER

## 2023-03-01 PROCEDURE — 87880 STREP A ASSAY W/OPTIC: CPT | Performed by: NURSE PRACTITIONER

## 2023-03-01 PROCEDURE — 87502 INFLUENZA DNA AMP PROBE: CPT | Performed by: NURSE PRACTITIONER

## 2023-03-01 PROCEDURE — U0002 COVID-19 LAB TEST NON-CDC: HCPCS | Performed by: NURSE PRACTITIONER

## 2023-03-01 RX ORDER — DICYCLOMINE HCL 20 MG
20 TABLET ORAL 4 TIMES DAILY PRN
Qty: 30 TABLET | Refills: 0 | Status: SHIPPED | OUTPATIENT
Start: 2023-03-01 | End: 2023-03-31

## 2023-03-01 RX ORDER — ONDANSETRON 4 MG/1
4 TABLET, ORALLY DISINTEGRATING ORAL EVERY 4 HOURS PRN
Qty: 10 TABLET | Refills: 0 | Status: SHIPPED | OUTPATIENT
Start: 2023-03-01 | End: 2023-03-08

## 2023-03-07 ENCOUNTER — APPOINTMENT (OUTPATIENT)
Dept: GENERAL RADIOLOGY | Age: 38
End: 2023-03-07
Attending: NURSE PRACTITIONER
Payer: MEDICAID

## 2023-03-07 ENCOUNTER — HOSPITAL ENCOUNTER (OUTPATIENT)
Age: 38
Discharge: HOME | End: 2023-03-07
Payer: MEDICAID

## 2023-03-07 VITALS
HEART RATE: 79 BPM | WEIGHT: 140 LBS | TEMPERATURE: 99 F | SYSTOLIC BLOOD PRESSURE: 129 MMHG | DIASTOLIC BLOOD PRESSURE: 85 MMHG | OXYGEN SATURATION: 99 % | BODY MASS INDEX: 26 KG/M2 | RESPIRATION RATE: 18 BRPM

## 2023-03-07 DIAGNOSIS — J20.8 ACUTE BRONCHITIS, VIRAL: ICD-10-CM

## 2023-03-07 DIAGNOSIS — R05.1 ACUTE COUGH: Primary | ICD-10-CM

## 2023-03-07 LAB — S PYO AG THROAT QL: NEGATIVE

## 2023-03-07 PROCEDURE — 99213 OFFICE O/P EST LOW 20 MIN: CPT | Performed by: NURSE PRACTITIONER

## 2023-03-07 PROCEDURE — 87880 STREP A ASSAY W/OPTIC: CPT | Performed by: NURSE PRACTITIONER

## 2023-03-07 PROCEDURE — 71046 X-RAY EXAM CHEST 2 VIEWS: CPT | Performed by: NURSE PRACTITIONER

## 2023-03-07 RX ORDER — BENZONATATE 100 MG/1
100 CAPSULE ORAL 3 TIMES DAILY PRN
Qty: 30 CAPSULE | Refills: 0 | Status: SHIPPED | OUTPATIENT
Start: 2023-03-07 | End: 2023-04-06

## 2023-03-07 RX ORDER — ARIPIPRAZOLE 2 MG/1
2 TABLET ORAL DAILY
COMMUNITY
Start: 2023-02-24

## 2023-03-07 RX ORDER — ALBUTEROL SULFATE 90 UG/1
2 AEROSOL, METERED RESPIRATORY (INHALATION) EVERY 4 HOURS PRN
Qty: 1 EACH | Refills: 0 | Status: SHIPPED | OUTPATIENT
Start: 2023-03-07 | End: 2023-04-06

## 2023-03-07 RX ORDER — PREDNISONE 20 MG/1
20 TABLET ORAL 2 TIMES DAILY
Qty: 10 TABLET | Refills: 0 | Status: SHIPPED | OUTPATIENT
Start: 2023-03-07 | End: 2023-03-12

## 2023-03-07 NOTE — ED INITIAL ASSESSMENT (HPI)
Pt with c/o of sore throat, HA, fevers body aches and productive cough x 8 days   (T max 100.1) has been taking tylenol last dose 1400 today    Pt was seen 03/01/2023 for same symptoms, tested negative for flu,covid and strep.

## 2023-03-07 NOTE — DISCHARGE INSTRUCTIONS
Follow-up with your primary care physician for all of your healthcare needs  Increase fluids keep well-hydrated use the Tessalon Perles for the cough use inhaler as needed take the steroids twice daily for 5 days  Tylenol for pain and fever gargle warm salt water  Return to the emergency department symptoms or concerns.

## 2023-06-23 ENCOUNTER — HOSPITAL ENCOUNTER (OUTPATIENT)
Dept: MAMMOGRAPHY | Facility: HOSPITAL | Age: 38
Discharge: HOME | End: 2023-06-23
Attending: NURSE PRACTITIONER
Payer: MEDICAID

## 2023-06-23 DIAGNOSIS — Z71.89 BREAST SELF EXAMINATION EDUCATION, ENCOUNTER FOR: ICD-10-CM

## 2023-06-23 DIAGNOSIS — Z80.3 FAMILY HISTORY OF BREAST CANCER: ICD-10-CM

## 2023-06-23 DIAGNOSIS — Z12.39 BREAST CANCER SCREENING, HIGH RISK PATIENT: ICD-10-CM

## 2023-06-23 DIAGNOSIS — Z91.89 AT HIGH RISK FOR BREAST CANCER: ICD-10-CM

## 2023-06-23 DIAGNOSIS — N64.52 DISCHARGE FROM LEFT NIPPLE: ICD-10-CM

## 2023-06-23 DIAGNOSIS — R92.2 DENSE BREAST TISSUE: ICD-10-CM

## 2023-06-23 DIAGNOSIS — N64.59 INVERSION OF LEFT NIPPLE: ICD-10-CM

## 2023-06-23 PROCEDURE — 77066 DX MAMMO INCL CAD BI: CPT | Performed by: NURSE PRACTITIONER

## 2023-06-23 PROCEDURE — 77062 BREAST TOMOSYNTHESIS BI: CPT | Performed by: NURSE PRACTITIONER

## 2024-04-20 ENCOUNTER — HOSPITAL ENCOUNTER (OUTPATIENT)
Age: 39
Discharge: HOME | End: 2024-04-20
Payer: MEDICAID

## 2024-04-20 VITALS
BODY MASS INDEX: 32 KG/M2 | WEIGHT: 169.75 LBS | TEMPERATURE: 98 F | OXYGEN SATURATION: 98 % | RESPIRATION RATE: 18 BRPM | HEART RATE: 86 BPM | DIASTOLIC BLOOD PRESSURE: 74 MMHG | SYSTOLIC BLOOD PRESSURE: 116 MMHG

## 2024-04-20 DIAGNOSIS — B34.9 VIRAL ILLNESS: Primary | ICD-10-CM

## 2024-04-20 RX ORDER — OLANZAPINE 5 MG/1
TABLET ORAL
COMMUNITY
Start: 2024-04-04

## 2024-04-20 RX ORDER — ACETAMINOPHEN 500 MG
1000 TABLET ORAL EVERY 6 HOURS PRN
COMMUNITY

## 2024-04-20 NOTE — ED PROVIDER NOTES
Patient Seen in: [facility]      History     Chief Complaint   Patient presents with    Cough/URI    Sore Throat    Body ache and/or chills    Fever     Stated Complaint: chills sore throat headache coughing    Subjective:   38-year-old female presents today with flulike symptoms that started last 2 to 3 days.  Denies any nausea vomiting diarrhea.  Felt feverish but did not take her temperature.  States did have COVID-19 in the house about a month ago.  Denies any shortness of breath or wheezing.  Does have worsening of sore throat with swelling to the tonsils.  Patient denies any difficulty swallowing controlling secretions.  No stridor shortness of breath.  Alert oriented x 3.  No other symptoms or concerns.  The patient's medication list, past medical history and social history elements as listed in today's nurse's notes were reviewed and agreed (except as otherwise stated in the HPI).  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              Past Surgical History:   Procedure Laterality Date    Biopsy of breast, incisional      left breast    Cholecystectomy      Excisional biopsy left  11/2015    benign excisional biopsy, no path    Lap sleeve gastrectomy      Aminah localization wire 1 site left (cpt=19281)      Removal gallbladder      Removal of fallopian tube                  Social History     Socioeconomic History    Marital status: Single   Tobacco Use    Smoking status: Never     Passive exposure: Never    Smokeless tobacco: Never   Vaping Use    Vaping status: Never Used   Substance and Sexual Activity    Alcohol use: Never    Drug use: Never   Social History Narrative    ** Merged History Encounter **          Social Determinants of Health      Received from [facility], [facility]    Social Connections    Received  from [facility], [facility]    Housing Stability              Review of Systems    Positive for stated complaint: chills sore throat headache coughing  Other systems are as noted in HPI.  Constitutional and vital signs reviewed.      All other systems reviewed and negative except as noted above.    Physical Exam     ED Triage Vitals [04/20/24 1115]   /74   Pulse 86   Resp 18   Temp 98.1 °F (36.7 °C)   Temp src Temporal   SpO2 98 %   O2 Device        Current:/74   Pulse 86   Temp 98.1 °F (36.7 °C) (Temporal)   Resp 18   Wt 77 kg   LMP 04/19/2024   SpO2 98%   BMI 32.07 kg/m²         Physical Exam  Vitals and nursing note reviewed.   Constitutional:       Appearance: She is well-developed.   HENT:      Head: Normocephalic.      Right Ear: Tympanic membrane and ear canal normal.      Left Ear: Tympanic membrane and ear canal normal.      Nose: Mucosal edema, congestion and rhinorrhea present.      Mouth/Throat:      Pharynx: Uvula midline. Pharyngeal swelling and posterior oropharyngeal erythema present.      Tonsils: No tonsillar exudate. 2+ on the right. 2+ on the left.   Eyes:      Conjunctiva/sclera: Conjunctivae normal.      Pupils: Pupils are equal, round, and reactive to light.   Cardiovascular:      Rate and Rhythm: Normal rate and regular rhythm.   Pulmonary:      Effort: Pulmonary effort is normal.      Breath sounds: Normal breath sounds.   Musculoskeletal:      Cervical back: Normal range of motion and neck supple.   Lymphadenopathy:      Cervical: No cervical adenopathy.   Skin:     General: Skin is warm and dry.   Neurological:      Mental Status: She is alert and oriented to person, place, and time.               ED Course     Labs Reviewed   POCT RAPID STREP - Normal   RAPID SARS-COV-2 BY PCR - Normal   POCT FLU TEST - Normal    Narrative:     This assay is a rapid molecular in vitro test utilizing nucleic acid amplification of influenza A and B  viral RNA.                      [facility]     Please note that this report has been produced using speech recognition software and may contain errors related to that system including, but not limited to, errors in grammar, punctuation, and spelling, as well as words and phrases that possibly may have been recognized inappropriately.  If there are any questions or concerns, contact the dictating provider for clarification.        Note to patient: The 21st Century Cures Act makes medical notes like these available to patients in the interest of transparency. However, this is a medical document intended as peer to peer communication. It is written in medical language and may contain abbreviations or verbiage that are unfamiliar. It may appear blunt or direct. Medical documents are intended to carry relevant information, facts as evident, and the clinical opinion of the practitioner.                                   Medical Decision Making  Differential diagnosis includes but is not limited to: COVID-19, viral URI, strep throat, influenza, pneumonia, sinusitis, bronchitis        Presents today with flulike symptoms over the last 2 to 3 days.  Patient afebrile on arrival.  Lungs were clear.  Rapid COVID-19 and flu were done and negative.  Patient states has also had worsening of sore throat.  Rapid strep was done and negative as well.  Suspect viral cause of illness.  Encouraged take over-the-counter antihistamine cough suppressant and use Flonase nasal spray.  To follow-up with primary care physician 1 week if symptoms do not improve.  Patient verbalized understanding and agreed plan of care.    Amount and/or Complexity of Data Reviewed  Labs: ordered. Decision-making details documented in ED Course.     Details: Rapid flu  Rapid COVID-19  Rapid strep    Risk  OTC drugs.        Disposition and Plan     Clinical Impression:  1. Viral illness         Disposition:  Discharge  4/20/2024 12:25 pm    Follow-up:  Mack Valencia,  MD  [address]  [address]  [facility] [address]  [phone]    In 1 week  As needed          Medications Prescribed:  Current Discharge Medication List

## 2024-04-20 NOTE — ED INITIAL ASSESSMENT (HPI)
Patient has been feeling like she has the flu since yesterday. She has congestion, chills, body aches, possible fever, and ear pain. Her daughter had covid about a month ago.

## 2024-04-20 NOTE — DISCHARGE INSTRUCTIONS
Push fluids and rest, alternate Tylenol and Motrin for any fever pain.  Take over-the-counter antihistamine and cough suppressant as needed.  Use a cool-mist humidifier at the bedside.  To follow primary care physician in 1 week if symptoms do not improve.  Take over-the-counter zinc and vitamin C to boost your immune system.

## 2024-08-18 ENCOUNTER — HOSPITAL ENCOUNTER (OUTPATIENT)
Age: 39
Discharge: HOME | End: 2024-08-18
Payer: MEDICAID

## 2024-08-18 VITALS
DIASTOLIC BLOOD PRESSURE: 78 MMHG | RESPIRATION RATE: 16 BRPM | HEART RATE: 77 BPM | SYSTOLIC BLOOD PRESSURE: 126 MMHG | OXYGEN SATURATION: 99 % | TEMPERATURE: 98 F

## 2024-08-18 DIAGNOSIS — H66.91 RIGHT OTITIS MEDIA, UNSPECIFIED OTITIS MEDIA TYPE: Primary | ICD-10-CM

## 2024-08-18 RX ORDER — AMOXICILLIN AND CLAVULANATE POTASSIUM 875; 125 MG/1; MG/1
1 TABLET, FILM COATED ORAL 2 TIMES DAILY
Qty: 14 TABLET | Refills: 0 | Status: SHIPPED | OUTPATIENT
Start: 2024-08-18 | End: 2024-08-25

## 2024-08-18 NOTE — ED INITIAL ASSESSMENT (HPI)
Pt sts during the night began with right ear pain that extends down into neck. Ear feels plugged. Recent cold symptoms.

## 2024-08-18 NOTE — ED PROVIDER NOTES
Patient Seen in: [facility]      History     Chief Complaint   Patient presents with    Ear Problem Pain     Stated Complaint: ear pain    Subjective:   38-year-old female presents to the immediate care with complaint of right ear pain.  Patient states she woke up this morning with right ear pain that radiates down into her jaw.  She she feels like her lymph nodes are swollen on that side.  She has had some nasal congestion and runny nose that she states is typical for her.  She does have a history of environmental allergies.  She denies any fever, chills, ear drainage, recent swimming, dental pain, sinus pain, or sinus pressure.    The history is provided by the patient.         Objective:   Past Medical History:    BRCA1 negative    BRCA2 negative              Past Surgical History:   Procedure Laterality Date    Biopsy of breast, incisional      left breast    Cholecystectomy      Excisional biopsy left  11/2015    benign excisional biopsy, no path    Lap sleeve gastrectomy      Aminah localization wire 1 site left (cpt=19281)      Removal gallbladder      Removal of fallopian tube                  No pertinent social history.            Review of Systems   Constitutional: Negative.  Negative for chills, fatigue and fever.   HENT:  Positive for congestion, ear pain and rhinorrhea. Negative for ear discharge, hearing loss, sinus pressure, sinus pain, sore throat, trouble swallowing and voice change.    All other systems reviewed and are negative.      Positive for stated Chief Complaint: Ear Problem Pain    Other systems are as noted in HPI.  Constitutional and vital signs reviewed.      All other systems reviewed and negative except as noted above.    Physical Exam     ED Triage Vitals [08/18/24 0943]   /78   Pulse 77   Resp 16   Temp 98.4 °F (36.9 °C)   Temp src Temporal   SpO2 99 %   O2 Device None (Room air)       Current Vitals:   Vital Signs  BP: 126/78  Pulse: 77  Resp: 16  Temp: 98.4 °F  (36.9 °C)  Temp src: Temporal    Oxygen Therapy  SpO2: 99 %  O2 Device: None (Room air)            Physical Exam  Vitals and nursing note reviewed.   Constitutional:       General: She is not in acute distress.     Appearance: Normal appearance. She is normal weight. She is not ill-appearing.   HENT:      Head: Normocephalic and atraumatic.      Right Ear: Ear canal and external ear normal. Tympanic membrane is erythematous and bulging.      Left Ear: Tympanic membrane, ear canal and external ear normal.      Nose: Nose normal.      Mouth/Throat:      Mouth: Mucous membranes are moist.      Pharynx: Oropharynx is clear.   Eyes:      Conjunctiva/sclera: Conjunctivae normal.      Pupils: Pupils are equal, round, and reactive to light.   Cardiovascular:      Rate and Rhythm: Normal rate and regular rhythm.      Pulses: Normal pulses.      Heart sounds: Normal heart sounds.   Pulmonary:      Effort: Pulmonary effort is normal. No respiratory distress.      Breath sounds: Normal breath sounds.   Musculoskeletal:         General: Normal range of motion.   Skin:     General: Skin is warm and dry.   Neurological:      General: No focal deficit present.      Mental Status: She is alert and oriented to person, place, and time.   Psychiatric:         Mood and Affect: Mood normal.         Behavior: Behavior normal.           ED Course   Labs Reviewed - No data to display              MDM                             Medical Decision Making  38-year-old female with right otitis media.  Will treat with p.o. antibiotics.  No evidence of sepsis, otitis externa, CIRA, or mastoiditis.  Encouraged supportive management at home including use of antihistamines and nasal steroids to help with symptoms.  Patient can also use Tylenol and or ibuprofen to help with pain.  Follow-up with PCP or return as needed.    Risk  OTC drugs.  Prescription drug management.        Disposition and Plan     Clinical Impression:  1. Right otitis media,  unspecified otitis media type         Disposition:  Discharge  8/18/2024 10:06 am    Follow-up:  Mack Valencia MD  [address] [address]  [phone]    In 1 week  As needed          Medications Prescribed:  Discharge Medication List as of 8/18/2024 10:09 AM        START taking these medications    Details   amoxicillin clavulanate 875-125 MG Oral Tab Take 1 tablet by mouth 2 (two) times daily for 7 days., Normal, Disp-14 tablet, R-0

## 2024-08-18 NOTE — DISCHARGE INSTRUCTIONS
Rest and give plenty of fluids.  Use Tylenol and/or ibuprofen as needed for fever discomfort.  Start the antibiotics and make sure to finish the entire prescription.  Take Allegra, Zyrtec, or Claritin twice a day.  Use Flonase or Nasonex nasal spray 1 spray each nostril twice a day.  Use warm compresses to the ear to help with discomfort.  Follow-up with your primary care doctor in the next 5 to 7 days.

## 2024-11-26 ENCOUNTER — HOSPITAL ENCOUNTER (OUTPATIENT)
Age: 39
Discharge: HOME | End: 2024-11-26
Payer: MEDICAID

## 2024-11-26 VITALS
TEMPERATURE: 98 F | DIASTOLIC BLOOD PRESSURE: 77 MMHG | HEART RATE: 73 BPM | OXYGEN SATURATION: 100 % | RESPIRATION RATE: 18 BRPM | SYSTOLIC BLOOD PRESSURE: 117 MMHG

## 2024-11-26 DIAGNOSIS — J02.9 VIRAL PHARYNGITIS: ICD-10-CM

## 2024-11-26 DIAGNOSIS — J02.9 SORE THROAT: Primary | ICD-10-CM

## 2024-11-26 LAB — S PYO AG THROAT QL: NEGATIVE

## 2024-11-26 PROCEDURE — 99213 OFFICE O/P EST LOW 20 MIN: CPT | Performed by: NURSE PRACTITIONER

## 2024-11-26 PROCEDURE — 87880 STREP A ASSAY W/OPTIC: CPT | Performed by: NURSE PRACTITIONER

## 2024-11-26 RX ORDER — BENZOCAINE AND MENTHOL, UNSPECIFIED FORM 15; 2.3 MG/1; MG/1
1 LOZENGE ORAL EVERY 2 HOUR PRN
Qty: 16 LOZENGE | Refills: 0 | Status: SHIPPED | OUTPATIENT
Start: 2024-11-26

## 2024-11-26 RX ORDER — GABAPENTIN 100 MG/1
100 CAPSULE ORAL 3 TIMES DAILY
COMMUNITY
Start: 2024-06-03

## 2024-11-26 RX ORDER — OLANZAPINE 5 MG/1
5 TABLET ORAL NIGHTLY
COMMUNITY

## 2024-11-26 NOTE — ED PROVIDER NOTES
Patient Seen in: [facility]      History     Chief Complaint   Patient presents with    Sore Throat     Stated Complaint: sore throat, swollen tonsils    Subjective:   39-year-old female, who presents with sore throat for the last couple days.  States she has been dealing with sinus congestion for the last couple months, states she was treated with Augmentin in October for sinus infection.  No known fevers.  No vomiting.              Objective:     Past Medical History:    Anemia    BRCA1 negative    BRCA2 negative              Past Surgical History:   Procedure Laterality Date    Biopsy of breast, incisional      left breast    Cholecystectomy      Excisional biopsy left  11/2015    benign excisional biopsy, no path    Lap sleeve gastrectomy      Aminah localization wire 1 site left (cpt=19281)      Removal gallbladder      Removal of fallopian tube                  No pertinent social history.            Review of Systems   Constitutional: Negative.    HENT:  Positive for congestion and sore throat.    All other systems reviewed and are negative.      Positive for stated complaint: sore throat, swollen tonsils  Other systems are as noted in HPI.  Constitutional and vital signs reviewed.      All other systems reviewed and negative except as noted above.    Physical Exam     ED Triage Vitals [11/26/24 1721]   /77   Pulse 73   Resp 18   Temp 98.1 °F (36.7 °C)   Temp src Temporal   SpO2 100 %   O2 Device None (Room air)       Current Vitals:   Vital Signs  BP: 117/77  Pulse: 73  Resp: 18  Temp: 98.1 °F (36.7 °C)  Temp src: Temporal    Oxygen Therapy  SpO2: 100 %  O2 Device: None (Room air)        Physical Exam  Vitals and nursing note reviewed.   Constitutional:       General: She is not in acute distress.     Appearance: She is well-developed. She is not ill-appearing, toxic-appearing or diaphoretic.   HENT:      Head:      Jaw: No trismus.      Right Ear: Tympanic membrane, ear canal and external  ear normal.      Left Ear: Tympanic membrane, ear canal and external ear normal.      Nose:      Right Sinus: No maxillary sinus tenderness or frontal sinus tenderness.      Left Sinus: No maxillary sinus tenderness or frontal sinus tenderness.      Mouth/Throat:      Lips: Pink. No lesions.      Mouth: Mucous membranes are moist. No oral lesions or angioedema.      Tongue: No lesions.      Palate: No lesions.      Pharynx: Oropharynx is clear. Uvula midline. No pharyngeal swelling, oropharyngeal exudate, posterior oropharyngeal erythema or uvula swelling.   Cardiovascular:      Rate and Rhythm: Normal rate and regular rhythm.      Pulses: Normal pulses.      Heart sounds: Normal heart sounds.   Pulmonary:      Effort: Pulmonary effort is normal. No respiratory distress.      Breath sounds: Normal breath sounds. No stridor. No wheezing, rhonchi or rales.   Musculoskeletal:      Cervical back: Normal range of motion and neck supple.   Skin:     General: Skin is warm and dry.      Coloration: Skin is not jaundiced or pale.      Findings: No rash.   Neurological:      General: No focal deficit present.      Mental Status: She is alert.   Psychiatric:         Mood and Affect: Mood normal.             ED Course     Labs Reviewed   POCT RAPID STREP - Normal                   MDM              Medical Decision Making  Differential diagnosis initially included but was not limited to: Strep pharyngitis, viral pharyngitis    Adult female patient with sore throat.There is no trismus, drooling, unilateral tonsillar tonsillar swelling, voice change/muffled voice, so I do not think this is epiglottitis/peritonsillar abscess.  Strep is negative.  Likely viral etiology.    Supportive/home management of diagnosis/illness/injury discussed. Red flag symptoms discussed.  Signs and symptoms/criteria that would necessitate reevaluation, including ER evaluation discussed.  Patient and/or responsible adult verbalize and agree with  management and plan of care.    Speech recognition software was used during this dictation.  There may be minor errors in transcription.      Amount and/or Complexity of Data Reviewed  Labs: ordered. Decision-making details documented in ED Course.  ECG/medicine tests: ordered. Decision-making details documented in ED Course.    Risk  OTC drugs.  Prescription drug management.        Disposition and Plan     Clinical Impression:  1. Sore throat    2. Viral pharyngitis         Disposition:  Discharge  11/26/2024  6:06 pm    Follow-up:  Mack Valencia MD  [facility] [address]  [phone]    Call in 3 days            Medications Prescribed:  Current Discharge Medication List        START taking these medications    Details   Benzocaine-Menthol (CEPACOL) 15-2.3 MG Mouth/Throat Lozenge Use as directed 1 lozenge in the mouth or throat every 2 (two) hours as needed.  Qty: 16 lozenge, Refills: 0                 Supplementary Documentation:

## 2024-11-26 NOTE — DISCHARGE INSTRUCTIONS
For the nasal congestion, consider taking an over-the-counter nasal decongestion such as Flonase.  Also consider taking an antihistamine daily such as Zyrtec for the next couple weeks.  I also recommend a cool-mist humidifier in the same room at night, steam inhalation such as hot steam showers, sinus rinses such as the Ramsey pot.    Interventions for sore throat: Warm salt water gargles 3 times daily.  Take a teaspoon of honey on its own or  in another liquid like juice or tea. Cough or throat drops.  Cepacol lozenges as needed.

## 2024-11-26 NOTE — ED INITIAL ASSESSMENT (HPI)
Patient reports sore throat and nasal congestion x 2 days, pt states that she has been \"sick\" for over a month now, she completed a course of Augmentin in October and states that she has never fully recovered, intermittent low grade fever

## (undated) DEVICE — 1200CC GUARDIAN II: Brand: GUARDIAN

## (undated) DEVICE — ENDOSCOPY PACK UPPER: Brand: MEDLINE INDUSTRIES, INC.

## (undated) DEVICE — ESOPHAGEAL BALLOON DILATATION CATHETER: Brand: CRE FIXED WIRE

## (undated) DEVICE — 3M™ RED DOT™ MONITORING ELECTRODE WITH FOAM TAPE AND STICKY GEL, 50/BAG, 20/CASE, 72/PLT 2570: Brand: RED DOT™

## (undated) DEVICE — CATH BALLOON CRE 18-20MM 5838

## (undated) DEVICE — CATH BALLOON CRE 15-18MM 5837

## (undated) DEVICE — FORCEP BIOPSY RJ4 LG CAP W/ND

## (undated) DEVICE — Device: Brand: DEFENDO AIR/WATER/SUCTION AND BIOPSY VALVE

## (undated) DEVICE — FILTERLINE NASAL ADULT O2/CO2

## (undated) DEVICE — SYRINGE/GUAGE ASSEMBLY

## (undated) NOTE — LETTER
Katie Harvey 89  [address] [facility] [address], [address] [facility]  Authorization for Surgical Operation and Procedure     Date:___________                                                                                                         Time:__________ occur: fever and allergic reactions, hemolytic reactions, transmission of diseases such as Hepatitis, AIDS and Cytomegalovirus (CMV) and fluid overload.   In the event that I wish to have an autologous transfusion of my own blood, or a directed donor transf applicable recovery period ends for purposes of reinstating the DNAR order.   10. Patients having a sterilization procedure: I understand that if the procedure is successful the results will be permanent and it will therefore be impossible for me to insemin medicine and do additional procedures as necessary. Some examples are: Starting or using an “IV” to give me medicine, fluids or blood during my procedure, and having a breathing tube placed to help me breathe when I’m asleep (intubation).  In the event that but potential complications include headache, bleeding, infection, seizure, irregular heart rhythms, and nerve injury.     I can change my mind about having anesthesia services at any time before I get the medicine.    ______________________________________

## (undated) NOTE — LETTER
To Whom It May Concern:   This certifies that She Grewal was hospitalized at [facility] from 9/8/21 to 9/13/21            [address] [place] [address] Case [id number]  [phone]        Document generated by:  Tay Fink RN

## (undated) NOTE — LETTER
Date & Time: 3/1/2023, 12:28 PM  Patient: Cj Pena  Encounter Provider(s):    LUCIANO Craven       To Whom It May Concern:    Matt Baca was seen and treated in our department on 3/1/2023. She may return to work once symptoms have improved and remains fever free for 24 hours.     If you have any questions or concerns, please do not hesitate to call.        _____________________________  Physician/APC Signature

## (undated) NOTE — IP AVS SNAPSHOT
Patient Demographics     Address  [address] [place] [id number] Phone  [phone] [facility]  [phone] [facility] E-mail Address  [email or web address]      Emergency Contact(s)     Name Relation Home Work [address], [address] Partner 6 (PROTONIX) 40 mg in Sodium Chloride (PF) 0.9 % 10 mL IV push 09/12/21 2050 Given      [id number] Pantoprazole Sodium (PROTONIX) 40 mg in Sodium Chloride (PF) 0.9 % 10 mL IV push 09/13/21 0811 Given      [id number] Prochlorperazine Edisylate (COMPAZINE) injec [facility])   Chloride 106 98 - 112 mmol/L — [facility])   CO2 22.0 21.0 - 32.0 mmol/L — [facility])   Anion Gap 10 0 - 18 mmol/L — [facility] ([facility])   BUN 1 7 - 18 mg/dL L [facility])   Creatinine 0.21 0.55 - 1.02 mg/dL L [facility])   Ca H&P signed by Cliff Gaona MD at 9/9/2021  5:11 PM  Version 1 of 1    Author: Cliff Gaona MD Service: Internal Medicine Author Type: Physician    Filed: 9/9/2021  5:11 PM Date of Service: 9/9/2021  8:08 AM Status: Signed    : Cliff Gaona MD (P what's stated above.      OBJECTIVE:  /67 (BP Location: Right arm)   Pulse 64   Temp 98 °F (36.7 °C) (Oral)   Resp 18   Ht 5' 1\" (1.549 m)   Wt 155 lb (70.3 kg)   LMP 08/05/2021   SpO2 96%   BMI 29.29 kg/m²   General:  Alert, no distress   Head:  Nor gastroesophageal reflux. 3. Persistent area of narrowing in the proximal gastric sleeve. This is nonspecific in appearance and could represent an area of edematous change involving the gastric wall.  EGD can be considered for direct visualization if clinica and vomiting  Weight loss    History of Present Illness:  Bairon Sullivan is a a(n) 39year old female without significant PMH, underwent gastric sleeve in [facility] [place] 6 weeks ago.   Consult requested for intractable nausea and vomiting and 60 # weight loss over t surgery in July 2021. COMPARISON STUDY: Plain radiograph of the abdomen on 9/2/2021.      IMAGING PROTOCOL TECHNIQUE: Intermittent 4 pulse fluoroscopy was used, maximizing the utilization of   save-screen images in order to minimize the overall radiatio exam. Contrast did pass through this area through a narrow channel. The contrast readily flows through the pylorus and into the duodenum without obstruction or   significant delay.      DUODENUM:   The duodenal bulb is well visualized without gross abnorm mg, 1 mg, Intravenous, Q2H PRN **OR** morphINE sulfate (PF) 2 MG/ML injection 2 mg, 2 mg, Intravenous, Q2H PRN    Review of Systems:  Gastrointestinal: See above  General: Denies fatigue, chills/fever, night sweats, weight loss, loss of appetite, weight ga HGB 14.1 09/08/2021    HCT 39.1 09/08/2021    .0 09/08/2021    CREATSERUM 0.34 09/09/2021    BUN 7 09/09/2021     09/09/2021    K 2.2 09/09/2021     09/09/2021    CO2 23.0 09/09/2021    GLU 77 09/09/2021    CA 8.4 09/09/2021    ALB 3.4 0 consult  - NPO  - antiemetics  - See additional Care Plan goals for specific interventions   Patient/Family Short Term Goal     Interdisciplinary Progressing    Description: Patient's Short Term Goal: control nausea and vomiting     Interventions:   - NPO

## (undated) NOTE — IP AVS SNAPSHOT
[address]            (For Outpatient Use Only) Initial Admit Date: 9/8/2021   Inpt/Obs Admit Date: Inpt: 9/9/21 / Obs: 09/08/21   Discharge Date:    Huel Curling:  [de-identified]   MRN: [de-identified]   CSN: [id number]   CEID: [id number] Rel to Subscriber:    Hospital Account Financial Class: Medicaid Advantage    September 13, 2021